# Patient Record
Sex: MALE | Race: WHITE
[De-identification: names, ages, dates, MRNs, and addresses within clinical notes are randomized per-mention and may not be internally consistent; named-entity substitution may affect disease eponyms.]

---

## 2019-08-09 ENCOUNTER — HOSPITAL ENCOUNTER (OUTPATIENT)
Dept: HOSPITAL 59 - HOP | Age: 64
Discharge: HOME | End: 2019-08-09
Attending: INTERNAL MEDICINE
Payer: COMMERCIAL

## 2019-08-09 DIAGNOSIS — I10: ICD-10-CM

## 2019-08-09 DIAGNOSIS — Z12.11: Primary | ICD-10-CM

## 2019-08-09 DIAGNOSIS — E78.00: ICD-10-CM

## 2019-08-12 NOTE — OPERATIVE NOTE
SURGEON: Jeni Hess MD  



OPERATION: COLONOSCOPY. 



INDICATIONS: This is a 64-year-old male with average risk for colorectal cancer 
who presented for screening colonoscopy. 



POSTOPERATIVE DIAGNOSIS: Normal colon and terminal ileal mucosa. 



ANESTHESIA: There was no sedation used during this procedure. 



The procedure of colonoscopy and risks and alternatives of the procedure, 
including the risk of bleeding and perforation, among others, were explained to 
the patient who voiced understanding and agreed to have the procedure done. 
Physical examination was performed, and the patient was found stable for the 
procedure. 



PROCEDURE: The patient was placed in the left lateral position. A digital rectal
exam was performed and showed small hemorrhoids with no palpable rectal masses 
noted. An Olympus PCF-180AL colonoscope was then inserted into the rectum under 
direct visualization. It was advanced to the cecum without difficulty. The 
ileocecal valve and appendiceal orifice were identified and photographed. The 
colonic mucosa was carefully examined upon introduction of the colonoscope. 
There were no lesions noted. The ileocecal valve was intubated and terminal 
ileal mucosa was inspected for about 10 cm and it appeared normal. The 
colonoscope was then withdrawn while carefully examining the colonic mucosal 
surfaces. No other lesions were noted. The colonoscope was then withdrawn and 
the procedure was terminated. The patient tolerated the procedure well without 
any immediate complications. The patient remained with stable vital signs and 
was transferred to the recovery room.



RECOMMENDATIONS: 

1. The patient should be on a high-fiber diet. 

2. The patient is to have a repeat colonoscopy for screening in 10 years.



Thank you for allowing me to participate in the care of your patient. 

STEPHEN